# Patient Record
Sex: FEMALE | Race: WHITE
[De-identification: names, ages, dates, MRNs, and addresses within clinical notes are randomized per-mention and may not be internally consistent; named-entity substitution may affect disease eponyms.]

---

## 2017-10-28 ENCOUNTER — HOSPITAL ENCOUNTER (EMERGENCY)
Dept: HOSPITAL 25 - ED | Age: 57
Discharge: HOME | End: 2017-10-28
Payer: COMMERCIAL

## 2017-10-28 VITALS — SYSTOLIC BLOOD PRESSURE: 127 MMHG | DIASTOLIC BLOOD PRESSURE: 76 MMHG

## 2017-10-28 DIAGNOSIS — M79.89: ICD-10-CM

## 2017-10-28 DIAGNOSIS — I82.4Z2: Primary | ICD-10-CM

## 2017-10-28 LAB
ALBUMIN SERPL BCG-MCNC: 4 G/DL (ref 3.2–5.2)
ALP SERPL-CCNC: 82 U/L (ref 34–104)
ALT SERPL W P-5'-P-CCNC: 33 U/L (ref 7–52)
ANION GAP SERPL CALC-SCNC: 8 MMOL/L (ref 2–11)
AST SERPL-CCNC: 23 U/L (ref 13–39)
BUN SERPL-MCNC: 16 MG/DL (ref 6–24)
BUN/CREAT SERPL: 18.4 (ref 8–20)
CALCIUM SERPL-MCNC: 9.3 MG/DL (ref 8.6–10.3)
CHLORIDE SERPL-SCNC: 106 MMOL/L (ref 101–111)
GLOBULIN SER CALC-MCNC: 2.9 G/DL (ref 2–4)
GLUCOSE SERPL-MCNC: 93 MG/DL (ref 70–100)
HCO3 SERPL-SCNC: 24 MMOL/L (ref 22–32)
HCT VFR BLD AUTO: 41 % (ref 35–47)
HGB BLD-MCNC: 14.2 G/DL (ref 12–16)
MCH RBC QN AUTO: 32 PG (ref 27–31)
MCHC RBC AUTO-ENTMCNC: 35 G/DL (ref 31–36)
MCV RBC AUTO: 90 FL (ref 80–97)
POTASSIUM SERPL-SCNC: 4 MMOL/L (ref 3.5–5)
PROT SERPL-MCNC: 6.9 G/DL (ref 6.4–8.9)
RBC # BLD AUTO: 4.51 10^6/UL (ref 4–5.4)
SODIUM SERPL-SCNC: 138 MMOL/L (ref 133–145)
WBC # BLD AUTO: 9.3 10^3/UL (ref 3.5–10.8)

## 2017-10-28 PROCEDURE — 85730 THROMBOPLASTIN TIME PARTIAL: CPT

## 2017-10-28 PROCEDURE — 85025 COMPLETE CBC W/AUTO DIFF WBC: CPT

## 2017-10-28 PROCEDURE — 36415 COLL VENOUS BLD VENIPUNCTURE: CPT

## 2017-10-28 PROCEDURE — 85610 PROTHROMBIN TIME: CPT

## 2017-10-28 PROCEDURE — 99283 EMERGENCY DEPT VISIT LOW MDM: CPT

## 2017-10-28 PROCEDURE — 80053 COMPREHEN METABOLIC PANEL: CPT

## 2017-10-28 NOTE — RAD
HISTORY: Left leg pain and edema



COMPARISONS: None relevant



TECHNIQUE: Multiple transverse and longitudinal ultrasound images were obtained of the

left lower extremity from the level of the common femoral vein inferiorly through to the

infrapopliteal veins  using grayscale, color Doppler, and spectral Doppler imaging with

and without compression and with augmentation. Comparison images were obtained of the

contralateral common femoral vein.



FINDINGS:

VEINS: There is occlusive thrombus noted within the inferior extent of the left femoral

vein and popliteal vein. The remainder of the venous system of the left lower extremity is

compressible throughout its course, with normal flow on color Doppler imaging and normal

response to augmentation on spectral Doppler imaging.



SOFT TISSUES: Unremarkable.



OTHER FINDINGS: None.



IMPRESSION: 

OCCLUSIVE THROMBUS OF THE INFERIOR EXTENT OF THE LEFT FEMORAL VEIN AND POPLITEAL VEIN

## 2017-10-28 NOTE — ED
Ender GEE Abhishek, scribed for All Johnson on 10/28/17 at 0717 .





Lower Extremity





- HPI Summary


HPI Summary: 


This patient is a 57 [facility] F [facility] with a 

chief complaint of edema on LLE since this week. CC is described as worse since 

today. Pt states the left calf was twice the size of the right calf. The 

patient rates the pain 3/10 in severity. Symptoms aggravated by nothing. 

Symptoms alleviated by nothing. Patient reports pain in the back of the LLE. 

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 








- History of Current Complaint


Chief Complaint: EDExtremityLower


Stated Complaint: LT LEG SWELLING


Time Seen by Provider: 10/28/17 02:09


Hx Obtained From: Patient


Onset/Duration: Worse Since - today


Severity Initially: Mild


Severity Currently: Mild


Pain Intensity: 3


Pain Scale Used: 0-10 Numeric


Timing: Constant, Lasting Weeks - one week


Location: Other - LLE


Associated Signs And Symptoms: Positive: Swelling


Aggravating Factor(s): Nothing


Alleviating Factor(s): Nothing





- Allergies/Home Medications


Allergies/Adverse Reactions: 


 Allergies











Allergy/AdvReac Type Severity Reaction Status Date / Time


 


Prochlorperazine Allergy Severe Unknown Verified 10/28/17 00:47





[From Compazine]   Reaction  





   Details  














PMH/Surg Hx/FS Hx/Imm Hx


Endocrine/Hematology History: 


   Denies: Hx Diabetes, Hx Sickle Cell Disease


Cardiovascular History: 


   Denies: Hx Hypertension, Hx Pacemaker/ICD, Other Cardiovascular Problems/

Disorders


Respiratory History: Reports: Hx Seasonal Allergies


   Denies: Other Respiratory Problems/Disorders


GI History: 


   Comment Only: Other GI Disorders - hx prolapse with chronic constipation


 History: Reports: Hx Kidney Infection - IN THE PAST


Musculoskeletal History: Reports: Hx Arthritis - SPINAL, Hx Back Problems, 

Other Musculoskeletal History


Sensory History: Reports: Hx Contacts or Glasses - GLASSES


   Denies: Hx Hearing Aid


Opthamlomology History: Reports: Hx Contacts or Glasses - GLASSES


Neurological History: Reports: Hx Migraine - 1 Q YEAR


Psychiatric History: Reports: Hx Depression


   Denies: Hx Panic Disorder, Other Psychiatric Issues/Disorders





- Surgical History


Surgery Procedure, Year, and Place: SPINAL FUSION WITH RODS AND PINS 1/2013.  

PROLAPSE BLADDER SURGERY.  WISDOM TEETH.  HYSTERCTOMY


Hx Anesthesia Reactions: Yes - VOMITING





- Immunization History


Date of Tetanus Vaccine: utd


Date of Influenza Vaccine: none


Infectious Disease History: No


Infectious Disease History: 


   Denies: Hx Clostridium Difficile, Hx Hepatitis, Hx Human Immunodeficiency 

Virus (HIV), Hx of Known/Suspected MRSA, Hx Shingles, Hx Tuberculosis, Hx Known/

Suspected VRE, Hx Known/Suspected VRSA, History Other Infectious Disease, 

Traveled Outside the [place] in Last 30 Days





- Family History


Known Family History: Positive: Cardiac Disease, Diabetes, Other - cancer





- Social History


Alcohol Use: Rare


Substance Use Type: Reports: None


Smoking Status (MU): Never Smoked Tobacco





Review of Systems


Constitutional: Negative


Eyes: Negative


ENT: Negative


Cardiovascular: Negative


Respiratory: Negative


Gastrointestinal: Negative


Genitourinary: Negative


Positive: Edema - LLE at the left calf, Other - LLE pain


Skin: Negative


Neurological: Negative


Psychological: Normal


All Other Systems Reviewed And Are Negative: Yes





Physical Exam





- Summary


Physical Exam Summary: 


Physical Exam Findings


Appearance: Well appearing, no pain distress


Skin: warm, dry, reflects adequate perfusion


Head/face: normal


Eyes: EOMI, BETHANY


ENT: normal


Neck: supple, nontender


Respiratory: CTA, breath sounds present


Cardiovascular: RRR, pulses symmetrical 


Abdomen: nontender, soft


Bowel: present


Musculoskeletal: Left calf tenderness, Mild swelling on the LLE 


Neuro: normal, sensory motor intact, A&Ox3, No neurological deficit





Triage Information Reviewed: Yes


Vital Signs On Initial Exam: 


 Initial Vitals











Temp Pulse Resp BP Pulse Ox


 


 98.7 F   84   14   163/90   98 


 


 10/28/17 00:39  10/28/17 00:39  10/28/17 00:39  10/28/17 00:39  10/28/17 00:39











Vital Signs Reviewed: Yes





- Rosalee Coma Scale


Coma Scale Total: 15





Diagnostics





- Vital Signs


 Vital Signs











  Temp Pulse Resp BP Pulse Ox


 


 10/28/17 06:30   67   127/76  96


 


 10/28/17 06:03   72    95


 


 10/28/17 06:01     124/79 


 


 10/28/17 03:50   69    95


 


 10/28/17 03:30     115/73 


 


 10/28/17 03:00   70   123/62  96


 


 10/28/17 02:30   69   129/80  95


 


 10/28/17 02:14   72    96


 


 10/28/17 02:12     141/74 


 


 10/28/17 00:39  98.7 F  84  14  163/90  98














- Laboratory


Lab Results: 


 Lab Results











  10/28/17 10/28/17 10/28/17 Range/Units





  06:00 06:00 06:00 


 


WBC   9.3   (3.5-10.8)  10^3/ul


 


RBC   4.51   (4.0-5.4)  10^6/ul


 


Hgb   14.2   (12.0-16.0)  g/dl


 


Hct   41   (35-47)  %


 


MCV   90   (80-97)  fL


 


MCH   32 H   (27-31)  pg


 


MCHC   35   (31-36)  g/dl


 


RDW   13   (10.5-15)  %


 


Plt Count   236   (150-450)  10^3/ul


 


MPV   8   (7.4-10.4)  um3


 


Neut % (Auto)   60.5   (38-83)  %


 


Lymph % (Auto)   25.9   (25-47)  %


 


Mono % (Auto)   7.2   (1-9)  %


 


Eos % (Auto)   5.2   (0-6)  %


 


Baso % (Auto)   1.2   (0-2)  %


 


Absolute Neuts (auto)   5.6   (1.5-7.7)  10^3/ul


 


Absolute Lymphs (auto)   2.4   (1.0-4.8)  10^3/ul


 


Absolute Monos (auto)   0.7   (0-0.8)  10^3/ul


 


Absolute Eos (auto)   0.5   (0-0.6)  10^3/ul


 


Absolute Basos (auto)   0.1   (0-0.2)  10^3/ul


 


Absolute Nucleated RBC   0.01   10^3/ul


 


Nucleated RBC %   0.1   


 


INR (Anticoag Therapy)  0.92    (0.89-1.11)  


 


APTT  28.8    (26.0-36.3)  seconds


 


Sodium    138  (133-145)  mmol/L


 


Potassium    4.0  (3.5-5.0)  mmol/L


 


Chloride    106  (101-111)  mmol/L


 


Carbon Dioxide    24  (22-32)  mmol/L


 


Anion Gap    8  (2-11)  mmol/L


 


BUN    16  (6-24)  mg/dL


 


Creatinine    0.87  (0.51-0.95)  mg/dL


 


Est GFR ( Amer)    86.9  (>60)  


 


Est GFR (Non-Af Amer)    67.6  (>60)  


 


BUN/Creatinine Ratio    18.4  (8-20)  


 


Glucose    93  ()  mg/dL


 


Calcium    9.3  (8.6-10.3)  mg/dL


 


Total Bilirubin    0.70  (0.2-1.0)  mg/dL


 


AST    23  (13-39)  U/L


 


ALT    33  (7-52)  U/L


 


Alkaline Phosphatase    82  ()  U/L


 


Total Protein    6.9  (6.4-8.9)  g/dL


 


Albumin    4.0  (3.2-5.2)  g/dL


 


Globulin    2.9  (2-4)  g/dL


 


Albumin/Globulin Ratio    1.4  (1-3)  











Result Diagrams: 


 10/28/17 06:00





 10/28/17 06:00


Lab Statement: Any lab studies that have been ordered have been reviewed, and 

results considered in the medical decision making process.





- Ultrasound


  ** No standard instances


Ultrasound Interpretation Completed By: Radiologist - Venous doppler study 

reveals no deep vein thrombrosis. ED physician has reveiwed this radiology 

report and agrees.





Lower Extremity Course/Dx





- Course


Course Of Treatment: This patient is a 57 [facility] F [facility] 

accompan[facility] by male with a chief complaint of edema on LLE since this week. 

Patient reports pain in the back of the LLE. Venous Doppler study reveals no 

deep vein thrombosis. Patient will be (discharged) with a Dx of DVT on the LLE 

with a follow up from PCP within 3 days. Pt is agreeable with this plan.





- Diagnoses


Differential Diagnosis/HQI/PQRI: Positive: Bursitis, Contusion, DVT, Phlebitis, 

Strain


Provider Diagnoses: 


 Deep vein thrombosis








Discharge





- Discharge Plan


Condition: Stable


Disposition: HOME


Prescriptions: 


Apixaban* [Eliquis] 10 mg PO BID #14 tab


Apixaban* [Eliquis*] 5 mg PO BID #60 tab


Patient Education Materials:  Deep Venous Thrombosis (ED)


Referrals: 


Hao Myers MD [Primary Care Provider] -  (Follow up with PCP within 3 days.

)





The documentation as recorded by the Ender pitt Abhishek accurately reflects 

the service I personally performed and the decisions made by Alex cotto Emmanuel.

## 2020-02-22 ENCOUNTER — HOSPITAL ENCOUNTER (EMERGENCY)
Dept: HOSPITAL 25 - UCCORT | Age: 60
Discharge: HOME | End: 2020-02-22
Payer: COMMERCIAL

## 2020-02-22 VITALS — DIASTOLIC BLOOD PRESSURE: 96 MMHG | SYSTOLIC BLOOD PRESSURE: 146 MMHG

## 2020-02-22 DIAGNOSIS — Z79.899: ICD-10-CM

## 2020-02-22 DIAGNOSIS — F32.9: ICD-10-CM

## 2020-02-22 DIAGNOSIS — Z88.8: ICD-10-CM

## 2020-02-22 DIAGNOSIS — N39.0: Primary | ICD-10-CM

## 2020-02-22 PROCEDURE — 87077 CULTURE AEROBIC IDENTIFY: CPT

## 2020-02-22 PROCEDURE — 87086 URINE CULTURE/COLONY COUNT: CPT

## 2020-02-22 PROCEDURE — 99212 OFFICE O/P EST SF 10 MIN: CPT

## 2020-02-22 PROCEDURE — G0463 HOSPITAL OUTPT CLINIC VISIT: HCPCS

## 2020-02-22 PROCEDURE — 81003 URINALYSIS AUTO W/O SCOPE: CPT

## 2020-02-22 NOTE — UC
Complaint Female HPI





- HPI Summary


HPI Summary: 





59-year-old female with burning on urination, frequency and urgency over the 

past couple of days.  She denies any fever or chills.  She does not have a 

history of urinary tract infections.





- History Of Current Complaint


Chief Complaint: UCGU


Stated Complaint: URINARY


Time Seen by Provider: 02/22/20 18:08


Hx Obtained From: Patient


Pregnant?: No


Onset/Duration: Gradual Onset


Timing: Intermittent


Severity Initially: Mild


Severity Currently: Mild


Pain Intensity: 3


Character: Burning


Aggravating Factor(s): Urination


Alleviating Factor(s): Nothing


Associated Signs And Symptoms: Positive: Negative





- Allergies/Home Medications


Allergies/Adverse Reactions: 


 Allergies











Allergy/AdvReac Type Severity Reaction Status Date / Time


 


MS Prochlorperazine Allergy Severe Unknown Verified 02/22/20 18:39





[From Compazine]   Reaction  





   Details  











Home Medications: 


 Home Medications





Bupropion HCl [Bupropion HCl Xl] 300 mg PO DAILY 12/28/12 [History Confirmed 03/

18/15]


Nystatin CREAM* [Nystatin Cream*] 1 applic TOPICAL 12/28/12 [History Confirmed 

03/18/15]


Docusate Sodium [Stool Softener] 100 mg PO DAILY 05/23/13 [History Confirmed 03/

18/15]


Fiber 1 PO DAILY 01/06/14 [History Confirmed 03/18/15]


Omega-3 Fatty Acids [Fish Oil] 1 cap PO DAILY 01/06/14 [History Confirmed 03/18/

15]


Ibuprofen 600 mg PO PRN 03/18/15 [History Confirmed 03/18/15]


Gabapentin CAP(*) [Neurontin 300 CAP(*)] 300 mg PO BEDTIME 02/22/20 [History 

Confirmed 02/22/20]


Montelukast Sodium TAB* [Singulair TAB*] 5 mg PO DAILY 02/22/20 [History 

Confirmed 02/22/20]


Sulfamethox/Trimethoprim DS* [Bactrim /160 TAB*] 1 tab PO BID 5 Days #9 

tab 02/22/20 [Rx]











PMH/Surg Hx/FS Hx/Imm Hx


Previously Healthy: Yes


Psychological History: Depression





- Surgical History


Surgical History: Yes


Surgery Procedure, Year, and Place: SPINAL FUSION WITH RODS AND PINS 1/2013.  

PROLAPSE BLADDER SURGERY.  WISDOM TEETH.  HYSTERCTOMY





- Family History


Known Family History: Positive: Cardiac Disease, Diabetes, Other - cancer





- Social History


Lives: With Family


Alcohol Use: Rare


Substance Use Type: None


Smoking Status (MU): Never Smoked Tobacco





- Immunization History


Most Recent Influenza Vaccination: 11/2013


Most Recent Tetanus Shot: 3-4 YRS AGO


Most Recent Pneumonia Vaccination: NEVER





Review of Systems


All Other Systems Reviewed And Are Negative: Yes


Genitourinary: Positive: Dysuria, Frequency, Urgency


Is Patient Immunocompromised?: No





Physical Exam


Triage Information Reviewed: Yes


Appearance: Well-Appearing, No Pain Distress, Well-Nourished


Vital Signs: 


 Initial Vital Signs











Temp  97.9 F   02/22/20 18:25


 


Pulse  82   02/22/20 18:25


 


Resp  18   02/22/20 18:25


 


BP  146/96   02/22/20 18:25


 


Pulse Ox  98   02/22/20 18:25











Vital Signs Reviewed: Yes


Respiratory: Positive: Lungs clear, Normal breath sounds, No respiratory 

distress, No accessory muscle use


Cardiovascular: Positive: RRR, No Murmur, Pulses Normal, Brisk Capillary Refill


Abdomen Description: Positive: Nontender, No Organomegaly, Soft.  Negative: CVA 

Tenderness (R), CVA Tenderness (L), Distended, Guarding, Hepatomegaly, 

Splenomegaly


Bowel Sounds: Positive: Present


Musculoskeletal Exam: Normal


Neurological Exam: Normal


Psychological Exam: Normal


Skin Exam: Normal





 Complaint Female Dx





- Course


Course Of Treatment: 





Urinalysis: Positive for leukocytes.





Patient is comfortable here and in no distress.  She was given Bactrim DS one 

tab here and to follow-up tomorrow to fill the prescription at her pharmacy.





- Differential Dx/Diagnosis


Provider Diagnosis: 


 UTI (urinary tract infection)








Discharge ED





- Sign-Out/Discharge


Documenting (check all that apply): Patient Departure


All imaging exams completed and their final reports reviewed: No Studies





- Discharge Plan


Condition: Good


Disposition: HOME


Prescriptions: 


Sulfamethox/Trimethoprim DS* [Bactrim /160 TAB*] 1 tab PO BID 5 Days #9 

tab


Patient Education Materials:  Urinary Tract Infection in Women (DC)


Referrals: 


Hao Myers MD [Primary Care Provider] - 


Additional Instructions: 


Increase fluids, take the Bactrim with food.  Follow-up in the emergency room 

if you develop fever, chills, back pain or vomiting and unable keep medicine 

down.  Follow-up with your primary care provider if no improvement in 3 or 4 

days.





- Billing Disposition and Condition


Condition: GOOD


Disposition: Home